# Patient Record
Sex: FEMALE | Race: WHITE | NOT HISPANIC OR LATINO | Employment: FULL TIME | URBAN - NONMETROPOLITAN AREA
[De-identification: names, ages, dates, MRNs, and addresses within clinical notes are randomized per-mention and may not be internally consistent; named-entity substitution may affect disease eponyms.]

---

## 2023-01-25 ENCOUNTER — OFFICE VISIT (OUTPATIENT)
Dept: ENDOCRINOLOGY | Facility: CLINIC | Age: 38
End: 2023-01-25
Payer: COMMERCIAL

## 2023-01-25 VITALS
BODY MASS INDEX: 37.49 KG/M2 | OXYGEN SATURATION: 99 % | SYSTOLIC BLOOD PRESSURE: 122 MMHG | DIASTOLIC BLOOD PRESSURE: 86 MMHG | HEIGHT: 63 IN | HEART RATE: 109 BPM | WEIGHT: 211.6 LBS

## 2023-01-25 DIAGNOSIS — E06.3 HASHIMOTO'S THYROIDITIS: Primary | ICD-10-CM

## 2023-01-25 PROBLEM — E03.8 HYPOTHYROIDISM DUE TO HASHIMOTO'S THYROIDITIS: Status: ACTIVE | Noted: 2023-01-25

## 2023-01-25 PROCEDURE — 99203 OFFICE O/P NEW LOW 30 MIN: CPT | Performed by: NURSE PRACTITIONER

## 2023-01-25 NOTE — ASSESSMENT & PLAN NOTE
With her TPO antibodies being positive, suggests Hashimotos thyroiditis.  At the time of her most recent labs, her TFT's were in range.  She is clinically hypothyroid.  Will obtain TFT's today with medication initiation as indicated.  Will obtain US Thyroid.  Follow-up in 6 months.

## 2023-01-25 NOTE — PROGRESS NOTES
Chief Complaint   Patient presents with   • Thyroid Problem     Fatigue        Referring Provider  Yvonne Nunes APRN HPI Heather YOLANDA Butler is a 37 y.o. female had concerns including Thyroid Problem (Fatigue).   Seen as a new patient.  Thyroid.    She had issues with her thyroid levels fluctuating in the past.  She has been on T4 on and off in the past.  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  She has continued to have increased symptoms and had some other labs that were positive. She has intermittent compressive s/sx's.     Birth state: [place]  Previous history of radiation to face/neck: none  Consuming foods high in iodine: none  Family history of thyroid complications: sister-hypothyroidism, father-hypo    The following portions of the patient's history were reviewed and updated as appropriate: allergies, current medications, past family history, past medical history, past social history, past surgical history and problem list.    Thyroid Labs 10/2022  TSH: 1.42  TPO Antibody: 55.9 H    No past medical history on file.  No past surgical history on file.   No family history on file.   Social History     Socioeconomic History   • Marital status: Single   Tobacco Use   • Smoking status: Never   • Smokeless tobacco: Never   Vaping Use   • Vaping Use: Never used   Substance and Sexual Activity   • Alcohol use: Not Currently   • Drug use: Never   • Sexual activity: Defer      Allergies   Allergen Reactions   • Doxycycline Anaphylaxis      No current outpatient medications on file prior to visit.     No current facility-administered medications on file prior to visit.        Review of Systems   Constitutional: Positive for fatigue and unexpected weight gain.        Inability to lose weight.   Eyes: Positive for blurred vision.   Endocrine: Positive for cold intolerance.   Skin: Positive for dry skin.        Brittle hair   Psychiatric/Behavioral: Positive for sleep  "disturbance.   All other systems reviewed and are negative.     /86 (BP Location: Right arm, Patient Position: Sitting, Cuff Size: Adult)   Pulse 109   Ht 160 cm (63\")   Wt 96 kg (211 lb 9.6 oz)   SpO2 99%   BMI 37.48 kg/m²      Physical Exam  Vitals reviewed.   Constitutional:       Appearance: Normal appearance.   Eyes:      Extraocular Movements: Extraocular movements intact.   Neck:      Thyroid: Thyromegaly and thyroid tenderness present. No thyroid mass.   Cardiovascular:      Rate and Rhythm: Normal rate.   Pulmonary:      Effort: Pulmonary effort is normal.   Skin:     General: Skin is warm.   Neurological:      General: No focal deficit present.      Mental Status: She is alert and oriented to person, place, and time.   Psychiatric:         Mood and Affect: Mood normal.         Behavior: Behavior normal.         Thought Content: Thought content normal.         Judgment: Judgment normal.       Labs/Imaging  CMP  No results found for: GLUCOSE, BUN, CREATININE, EGFRIFNONA, EGFRIFAFRI, BCR, K, CO2, CALCIUM, PROTENTOTREF, ALBUMIN, LABIL2, BILIRUBIN, AST, ALT     CBC w/DIFF No results found for: WBC, RBC, HGB, HCT, MCV, MCH, MCHC, RDW, RDWSD, MPV, PLT, NEUTRORELPCT, LYMPHORELPCT, MONORELPCT, EOSRELPCT, BASORELPCT, AUTOIGPER, NEUTROABS, LYMPHSABS, MONOSABS, EOSABS, BASOSABS, AUTOIGNUM, NRBC    TSH  No results found for: TSH    T4  No results found for: FREET4  No results found for: G5OOJUH    T3  No results found for: T3FREE  No results found for: R7FFFQS    TRAb  No results found for: TSURCPAB    TPO  No results found for: THYROIDAB    No valid procedures specified.    Assessment and Plan    Diagnoses and all orders for this visit:    1. Hashimoto's thyroiditis (Primary)  Assessment & Plan:  With her TPO antibodies being positive, suggests Hashimotos thyroiditis.  At the time of her most recent labs, her TFT's were in range.  She is clinically hypothyroid.  Will obtain TFT's today with medication " initiation as indicated.  Will obtain US Thyroid.  Follow-up in 6 months.    Orders:  -     T3, Free; Future  -     T4, Free; Future  -     TSH; Future  -     US Thyroid; Future    Other orders  -     Cancel: TSH  -     Cancel: T4, Free  -     Cancel: T3, Free  -     Cancel: US Thyroid  -     Cancel: US Thyroid; Future       Return in about 6 months (around 7/25/2023) for Follow-up appointment. The patient was instructed to contact the clinic with any interval questions or concerns.      This document has been electronically signed by LUIGI Gongora  January 25, 2023 13:49 EST   Endocrinology

## 2023-07-26 ENCOUNTER — OFFICE VISIT (OUTPATIENT)
Dept: ENDOCRINOLOGY | Facility: CLINIC | Age: 38
End: 2023-07-26
Payer: COMMERCIAL

## 2023-07-26 VITALS
HEIGHT: 63 IN | WEIGHT: 210.6 LBS | SYSTOLIC BLOOD PRESSURE: 122 MMHG | BODY MASS INDEX: 37.32 KG/M2 | OXYGEN SATURATION: 99 % | DIASTOLIC BLOOD PRESSURE: 76 MMHG | HEART RATE: 74 BPM

## 2023-07-26 DIAGNOSIS — E06.3 HASHIMOTO'S THYROIDITIS: Primary | ICD-10-CM

## 2023-07-26 RX ORDER — LEVOTHYROXINE SODIUM 0.03 MG/1
25 TABLET ORAL DAILY
Qty: 30 TABLET | Refills: 2 | Status: SHIPPED | OUTPATIENT
Start: 2023-07-26

## 2023-07-26 NOTE — ASSESSMENT & PLAN NOTE
-Clinically hypothyroid.    -Most recent TFT's in range.  Will try low dose T4 to see if it will help with her compressive s/sx's.  Start T4 25 mcg QD.  Reminded of proper administration including taking 7 pills per week on an empty stomach with no missed doses, waiting 30-60 minutes prior to other medications or food.  -Will obtain US Thyroid.    -Follow-up in 2 months.

## 2023-07-26 NOTE — PROGRESS NOTES
Chief Complaint   Patient presents with    Hashimoto's Thyroiditis        Referring Provider  No ref. provider found     HPI   Thea Butler is a 37 y.o. female had concerns including Hashimoto's Thyroiditis.   Hashimoto's Thyroiditis    She reports that she is having issues with increased fatigue, difficulty sleeping and mild intermittent compressive sx's.  Most recent labs:  07/25/2023  TSH: 1.2  Free T4: 1.13  Free T3: 3.23    She had issues with her thyroid levels fluctuating in the past.  She has been on T4 on and off in the past.  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  She has continued to have increased symptoms and had some other labs that were positive. She has intermittent compressive s/sx's.     Birth state: [place]  Previous history of radiation to face/neck: none  Consuming foods high in iodine: none  Family history of thyroid complications: sister-hypothyroidism, father-hypo    The following portions of the patient's history were reviewed and updated as appropriate: allergies, current medications, past family history, past medical history, past social history, past surgical history and problem list.      Thyroid Labs 10/2022  TSH: 1.42  TPO Antibody: 55.9 H    History reviewed. No pertinent past medical history.  History reviewed. No pertinent surgical history.   History reviewed. No pertinent family history.   Social History     Socioeconomic History    Marital status: Single   Tobacco Use    Smoking status: Never    Smokeless tobacco: Never   Vaping Use    Vaping Use: Never used   Substance and Sexual Activity    Alcohol use: Not Currently    Drug use: Never    Sexual activity: Defer      Allergies   Allergen Reactions    Doxycycline Anaphylaxis      No current outpatient medications on file prior to visit.     No current facility-administered medications on file prior to visit.        Review of Systems   Constitutional:  Positive for fatigue and unexpected  "weight gain.        Inability to lose weight.   HENT:  Positive for trouble swallowing.    Eyes:  Positive for blurred vision.   Endocrine: Positive for cold intolerance.   Musculoskeletal:  Positive for arthralgias and myalgias.   Skin:  Positive for dry skin.        Brittle hair   Psychiatric/Behavioral:  Positive for sleep disturbance.    All other systems reviewed and are negative.     /76 (BP Location: Left arm, Patient Position: Sitting, Cuff Size: Adult)   Pulse 74   Ht 160 cm (63\")   Wt 95.5 kg (210 lb 9.6 oz)   SpO2 99%   BMI 37.31 kg/m²      Physical Exam  Vitals reviewed.   Constitutional:       Appearance: Normal appearance.   Eyes:      Extraocular Movements: Extraocular movements intact.   Neck:      Thyroid: Thyromegaly and thyroid tenderness present. No thyroid mass.   Cardiovascular:      Rate and Rhythm: Normal rate.   Pulmonary:      Effort: Pulmonary effort is normal.   Skin:     General: Skin is warm.   Neurological:      General: No focal deficit present.      Mental Status: She is alert and oriented to person, place, and time.   Psychiatric:         Mood and Affect: Mood normal.         Behavior: Behavior normal.         Thought Content: Thought content normal.         Judgment: Judgment normal.     Labs/Imaging  CMP  No results found for: GLUCOSE, BUN, CREATININE, EGFRIFNONA, EGFRIFAFRI, BCR, K, CO2, CALCIUM, PROTENTOTREF, ALBUMIN, LABIL2, BILIRUBIN, AST, ALT     CBC w/DIFF No results found for: WBC, RBC, HGB, HCT, MCV, MCH, MCHC, RDW, RDWSD, MPV, PLT, NEUTRORELPCT, LYMPHORELPCT, MONORELPCT, EOSRELPCT, BASORELPCT, AUTOIGPER, NEUTROABS, LYMPHSABS, MONOSABS, EOSABS, BASOSABS, AUTOIGNUM, NRBC    TSH  No results found for: TSH    T4  No results found for: FREET4  No results found for: J4MNMIA    T3  No results found for: T3FREE  No results found for: W7DNNCV    TRAb  No results found for: TSURCPAB    TPO  No results found for: THYROIDAB    No valid procedures specified.    Assessment " and Plan    Diagnoses and all orders for this visit:    1. Hashimoto's thyroiditis (Primary)  Assessment & Plan:  -Clinically hypothyroid.    -Most recent TFT's in range.  Will try low dose T4 to see if it will help with her compressive s/sx's.  Start T4 25 mcg QD.  Reminded of proper administration including taking 7 pills per week on an empty stomach with no missed doses, waiting 30-60 minutes prior to other medications or food.  -Will obtain US Thyroid.    -Follow-up in 2 months.    Orders:  -     Cancel: US Thyroid; Future  -     TSH; Future  -     T4, Free; Future  -     T3, Free; Future  -     US Thyroid; Future    Other orders  -     levothyroxine (SYNTHROID, LEVOTHROID) 25 MCG tablet; Take 1 tablet by mouth Daily.  Dispense: 30 tablet; Refill: 2         Return in about 2 months (around 9/26/2023) for Follow-up appointment. The patient was instructed to contact the clinic with any interval questions or concerns.      This document has been electronically signed by LUIGI Gongora  July 26, 2023 14:31 EDT   Endocrinology

## 2023-11-28 NOTE — TELEPHONE ENCOUNTER
PATIENT IS SCHEDULED TO BE SEEN IN MARCH 2024. IF LABS ARE NEEDED, PLEASE SEND ORDERS TO [facility] AND LET PATIENT KNOW.

## 2023-11-29 RX ORDER — LEVOTHYROXINE SODIUM 0.03 MG/1
25 TABLET ORAL DAILY
Qty: 30 TABLET | Refills: 2 | Status: SHIPPED | OUTPATIENT
Start: 2023-11-29

## 2023-12-07 ENCOUNTER — TELEPHONE (OUTPATIENT)
Dept: ENDOCRINOLOGY | Facility: CLINIC | Age: 38
End: 2023-12-07
Payer: COMMERCIAL

## 2023-12-07 DIAGNOSIS — E06.3 HASHIMOTO'S THYROIDITIS: Primary | ICD-10-CM

## 2023-12-07 NOTE — TELEPHONE ENCOUNTER
Patient is requesting to get lab work done as she is having some symptoms. She would like to get it done at [facility]. I will fax order over to [phone].

## 2024-03-20 DIAGNOSIS — E06.3 HASHIMOTO'S THYROIDITIS: Primary | ICD-10-CM

## 2024-03-20 RX ORDER — LEVOTHYROXINE SODIUM 0.03 MG/1
25 TABLET ORAL DAILY
Qty: 30 TABLET | Refills: 1 | Status: SHIPPED | OUTPATIENT
Start: 2024-03-20 | End: 2024-03-20 | Stop reason: SDUPTHER

## 2024-03-20 RX ORDER — LEVOTHYROXINE SODIUM 0.03 MG/1
25 TABLET ORAL DAILY
Qty: 30 TABLET | Refills: 1 | Status: SHIPPED | OUTPATIENT
Start: 2024-03-20

## 2024-03-20 NOTE — TELEPHONE ENCOUNTER
Patient is wanting to get her lab work done prior to her visit at [facility]. Can you please order them and I will fax them over for patient?

## 2024-03-26 ENCOUNTER — OFFICE VISIT (OUTPATIENT)
Dept: ENDOCRINOLOGY | Facility: CLINIC | Age: 39
End: 2024-03-26
Payer: COMMERCIAL

## 2024-03-26 VITALS
WEIGHT: 208.2 LBS | DIASTOLIC BLOOD PRESSURE: 94 MMHG | HEART RATE: 85 BPM | SYSTOLIC BLOOD PRESSURE: 135 MMHG | BODY MASS INDEX: 36.89 KG/M2 | OXYGEN SATURATION: 96 % | HEIGHT: 63 IN

## 2024-03-26 DIAGNOSIS — E06.3 HASHIMOTO'S THYROIDITIS: Primary | ICD-10-CM

## 2024-03-26 PROCEDURE — 99213 OFFICE O/P EST LOW 20 MIN: CPT | Performed by: NURSE PRACTITIONER

## 2024-03-26 RX ORDER — LISDEXAMFETAMINE DIMESYLATE CAPSULES 50 MG/1
CAPSULE ORAL
COMMUNITY

## 2024-03-26 RX ORDER — PAROXETINE 30 MG/1
TABLET, FILM COATED ORAL
COMMUNITY

## 2024-03-26 NOTE — ASSESSMENT & PLAN NOTE
-Clinically hypothyroid.    -TFT's today with medication adjustment accordingly.  -Reminded of proper administration including taking 7 pills per week on an empty stomach with no missed doses, waiting 30-60 minutes prior to other medications or food.  -Will consider adding on T3 to help with symptoms.  -Follow-up in 6 months.

## 2024-03-26 NOTE — PROGRESS NOTES
Chief Complaint   Patient presents with    Hashimoto's Thyroiditis        Referring Provider  No ref. provider found     HPI   Thea Butler is a 38 y.o. female had concerns including Hashimoto's Thyroiditis.   Hashimoto's Thyroiditis    She is currently taking T4 25 mcg QD.  She is having increased symptoms with increased fatigue, difficulty sleeping and mild intermittent compressive sx's.  She is taking this regularly without missing doses.  She denies taking any conflicting medication.  US Thyroid: 03/2024  Findings:  The right lobe of the thyroid measures 5.4 x 1.9 x 1.3 cm and demonstrates a heterogeneous echotexture.  The left lobe of the thyroid 4.2 x 1.7 x 1.3 cm and demonstrates a heterogeneous echotexture.  The isthmus measures 0.4 cm.  There are no atypical lymph nodes identified within the bilateral neck.  Impression:  Heterogeneous thyroid gland.  No nodules.    History:  Most recent labs:  07/25/2023  TSH: 1.2  Free T4: 1.13  Free T3: 3.23    She had issues with her thyroid levels fluctuating in the past.  She has been on T4 on and off in the past.  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  She has continued to have increased symptoms and had some other labs that were positive. She has intermittent compressive s/sx's.     Birth state: [place]  Previous history of radiation to face/neck: none  Consuming foods high in iodine: none  Family history of thyroid complications: sister-hypothyroidism, father-hypo    The following portions of the patient's history were reviewed and updated as appropriate: allergies, current medications, past family history, past medical history, past social history, past surgical history and problem list.      Thyroid Labs 10/2022  TSH: 1.42  TPO Antibody: 55.9 H    History reviewed. No pertinent past medical history.  History reviewed. No pertinent surgical history.   History reviewed. No pertinent family history.   Social History  "    Socioeconomic History    Marital status: Single   Tobacco Use    Smoking status: Never    Smokeless tobacco: Never   Vaping Use    Vaping status: Never Used   Substance and Sexual Activity    Alcohol use: Not Currently    Drug use: Never    Sexual activity: Defer      Allergies   Allergen Reactions    Doxycycline Anaphylaxis      Current Outpatient Medications on File Prior to Visit   Medication Sig Dispense Refill    Cariprazine HCl (Vraylar) 3 MG capsule capsule       levothyroxine (SYNTHROID, LEVOTHROID) 25 MCG tablet Take 1 tablet by mouth Daily. 30 tablet 1    lisdexamfetamine (Vyvanse) 50 MG capsule       PARoxetine (Paxil) 30 MG tablet        No current facility-administered medications on file prior to visit.        Review of Systems   Constitutional:  Positive for fatigue and unexpected weight gain.        Inability to lose weight.   HENT:  Positive for trouble swallowing.    Eyes:  Positive for blurred vision.   Endocrine: Positive for cold intolerance.   Musculoskeletal:  Positive for arthralgias and myalgias.   Skin:  Positive for dry skin.        Brittle hair   Psychiatric/Behavioral:  Positive for sleep disturbance.    All other systems reviewed and are negative.     /94 (BP Location: Right arm, Patient Position: Sitting, Cuff Size: Adult)   Pulse 85   Ht 160 cm (63\")   Wt 94.4 kg (208 lb 3.2 oz)   SpO2 96%   BMI 36.88 kg/m²      Physical Exam  Vitals reviewed.   Constitutional:       Appearance: Normal appearance.   Eyes:      Extraocular Movements: Extraocular movements intact.   Neck:      Thyroid: Thyromegaly and thyroid tenderness present. No thyroid mass.   Cardiovascular:      Rate and Rhythm: Normal rate.   Pulmonary:      Effort: Pulmonary effort is normal.   Skin:     General: Skin is warm.   Neurological:      General: No focal deficit present.      Mental Status: She is alert and oriented to person, place, and time.   Psychiatric:         Mood and Affect: Mood normal.         " "Behavior: Behavior normal.         Thought Content: Thought content normal.         Judgment: Judgment normal.       Labs/Imaging  CMP  No results found for: \"GLUCOSE\", \"BUN\", \"CREATININE\", \"EGFRIFNONA\", \"EGFRIFAFRI\", \"BCR\", \"K\", \"CO2\", \"CALCIUM\", \"PROTENTOTREF\", \"ALBUMIN\", \"LABIL2\", \"BILIRUBIN\", \"AST\", \"ALT\"     CBC w/DIFF No results found for: \"WBC\", \"RBC\", \"HGB\", \"HCT\", \"MCV\", \"MCH\", \"MCHC\", \"RDW\", \"RDWSD\", \"MPV\", \"PLT\", \"NEUTRORELPCT\", \"LYMPHORELPCT\", \"MONORELPCT\", \"EOSRELPCT\", \"BASORELPCT\", \"AUTOIGPER\", \"NEUTROABS\", \"LYMPHSABS\", \"MONOSABS\", \"EOSABS\", \"BASOSABS\", \"AUTOIGNUM\", \"NRBC\"    TSH  No results found for: \"TSH\"    T4  No results found for: \"FREET4\"  No results found for: \"A5PBEKM\"    T3  No results found for: \"T3FREE\"  No results found for: \"C2ILFDU\"    TRAb  No results found for: \"TSURCPAB\"    TPO  No results found for: \"THYROIDAB\"    No valid procedures specified.    Assessment and Plan    Diagnoses and all orders for this visit:    1. Hashimoto's thyroiditis (Primary)  Assessment & Plan:  -Clinically hypothyroid.    -TFT's today with medication adjustment accordingly.  -Reminded of proper administration including taking 7 pills per week on an empty stomach with no missed doses, waiting 30-60 minutes prior to other medications or food.  -Will consider adding on T3 to help with symptoms.  -Follow-up in 6 months.    Orders:  -     Cancel: TSH  -     Cancel: T4, free  -     Cancel: T3, Free  -     T3, Free; Future  -     T4, free; Future  -     TSH; Future           Return in about 6 months (around 9/26/2024) for Follow-up appointment. The patient was instructed to contact the clinic with any interval questions or concerns.      This document has been electronically signed by LUIGI Gongora  March 26, 2024 12:47 EDT   Endocrinology  "

## 2024-04-26 RX ORDER — LEVOTHYROXINE SODIUM 0.03 MG/1
25 TABLET ORAL DAILY
Qty: 30 TABLET | Refills: 1 | Status: SHIPPED | OUTPATIENT
Start: 2024-04-26

## 2024-04-26 NOTE — TELEPHONE ENCOUNTER
Patient called to check on her lab work. We had not received that yet. I have tried several times to reach out to [facility] where she had it done and had to leave messages. The issue is patient will be out of medicine this weekend.

## 2024-09-26 ENCOUNTER — OFFICE VISIT (OUTPATIENT)
Dept: ENDOCRINOLOGY | Facility: CLINIC | Age: 39
End: 2024-09-26
Payer: COMMERCIAL

## 2024-09-26 VITALS
BODY MASS INDEX: 35.29 KG/M2 | OXYGEN SATURATION: 100 % | SYSTOLIC BLOOD PRESSURE: 124 MMHG | HEART RATE: 81 BPM | DIASTOLIC BLOOD PRESSURE: 87 MMHG | WEIGHT: 199.2 LBS

## 2024-09-26 DIAGNOSIS — E06.3 HASHIMOTO'S THYROIDITIS: Primary | ICD-10-CM

## 2024-09-26 RX ORDER — TRAZODONE HYDROCHLORIDE 50 MG/1
50 TABLET, FILM COATED ORAL NIGHTLY
COMMUNITY

## 2024-10-10 ENCOUNTER — TELEPHONE (OUTPATIENT)
Dept: ENDOCRINOLOGY | Facility: CLINIC | Age: 39
End: 2024-10-10
Payer: COMMERCIAL

## 2024-10-10 RX ORDER — LEVOTHYROXINE SODIUM 25 UG/1
25 TABLET ORAL DAILY
Qty: 30 TABLET | Refills: 1 | Status: SHIPPED | OUTPATIENT
Start: 2024-10-10 | End: 2024-10-11 | Stop reason: SDUPTHER

## 2024-10-11 RX ORDER — LEVOTHYROXINE SODIUM 25 UG/1
25 TABLET ORAL DAILY
Qty: 30 TABLET | Refills: 1 | Status: SHIPPED | OUTPATIENT
Start: 2024-10-11

## 2025-02-24 RX ORDER — LEVOTHYROXINE SODIUM 25 UG/1
25 TABLET ORAL DAILY
Qty: 30 TABLET | Refills: 1 | Status: SHIPPED | OUTPATIENT
Start: 2025-02-24

## 2025-03-18 DIAGNOSIS — E06.3 HASHIMOTO'S THYROIDITIS: Primary | ICD-10-CM

## 2025-03-28 ENCOUNTER — OFFICE VISIT (OUTPATIENT)
Dept: ENDOCRINOLOGY | Facility: CLINIC | Age: 40
End: 2025-03-28
Payer: COMMERCIAL

## 2025-03-28 VITALS
WEIGHT: 198.4 LBS | OXYGEN SATURATION: 99 % | HEART RATE: 80 BPM | DIASTOLIC BLOOD PRESSURE: 87 MMHG | BODY MASS INDEX: 35.14 KG/M2 | SYSTOLIC BLOOD PRESSURE: 130 MMHG

## 2025-03-28 DIAGNOSIS — E06.3 HASHIMOTO'S THYROIDITIS: Primary | ICD-10-CM

## 2025-03-28 RX ORDER — ESCITALOPRAM OXALATE 20 MG/1
20 TABLET ORAL DAILY
COMMUNITY
Start: 2025-02-10

## 2025-03-28 RX ORDER — LEVOTHYROXINE SODIUM 25 UG/1
25 TABLET ORAL DAILY
Qty: 90 TABLET | Refills: 1 | Status: SHIPPED | OUTPATIENT
Start: 2025-03-28

## 2025-03-28 RX ORDER — ERGOCALCIFEROL 1.25 MG/1
50000 CAPSULE, LIQUID FILLED ORAL WEEKLY
Qty: 5 CAPSULE | Refills: 4 | Status: SHIPPED | OUTPATIENT
Start: 2025-03-28

## 2025-03-28 NOTE — PROGRESS NOTES
Chief Complaint   Patient presents with    Thyroid Problem     F/u        Referring Provider  No ref. provider found     HPI   Thea Butler is a 39 y.o. female had concerns including Thyroid Problem (F/u).   Hashimoto's Thyroiditis    She still have some mild intermittent dysphagia with breads. She is otherwise doing well.  She is currently taking T4 25 mcg QD.  She is having increased symptoms with increased fatigue, difficulty sleeping and mild intermittent compressive sx's.  She is taking this regularly without missing doses.  She denies taking any conflicting medication.  Most recent labs: 03/2025  TSH: 1.240  Ft4: 1.43  Vit D: 24.7 L    US Thyroid: 03/2024  Findings:  The right lobe of the thyroid measures 5.4 x 1.9 x 1.3 cm and demonstrates a heterogeneous echotexture.  The left lobe of the thyroid 4.2 x 1.7 x 1.3 cm and demonstrates a heterogeneous echotexture.  The isthmus measures 0.4 cm.  There are no atypical lymph nodes identified within the bilateral neck.  Impression:  Heterogeneous thyroid gland.  No nodules.    History:  Most recent labs:  07/25/2023  TSH: 1.2  Free T4: 1.13  Free T3: 3.23    She had issues with her thyroid levels fluctuating in the past.  She has been on T4 on and off in the past.  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  She has continued to have increased symptoms and had some other labs that were positive. She has intermittent compressive s/sx's.     Birth state: [place]  Previous history of radiation to face/neck: none  Consuming foods high in iodine: none  Family history of thyroid complications: sister-hypothyroidism, father-hypo    The following portions of the patient's history were reviewed and updated as appropriate: allergies, current medications, past family history, past medical history, past social history, past surgical history and problem list.      Thyroid Labs 10/2022  TSH: 1.42  TPO Antibody: 55.9 H    Past Medical History:    Diagnosis Date    Hypothyroidism     Unknown    Vitamin D deficiency      History reviewed. No pertinent surgical history.   Family History   Problem Relation Age of Onset    Anemia Mother     Cancer Mother     Diabetes Father     Hypertension Father     Obesity Father     Thyroid disease Father     Diabetes Maternal Grandmother     Cancer Maternal Aunt     Cancer Maternal Uncle     Cancer Maternal Uncle     Hypertension Brother     Thyroid disease Sister       Social History     Socioeconomic History    Marital status: Single   Tobacco Use    Smoking status: Never    Smokeless tobacco: Never   Vaping Use    Vaping status: Never Used   Substance and Sexual Activity    Alcohol use: Yes     Comment: OCCASIONALLY    Drug use: Never    Sexual activity: Defer      Allergies   Allergen Reactions    Doxycycline Anaphylaxis      Current Outpatient Medications on File Prior to Visit   Medication Sig Dispense Refill    escitalopram (Lexapro) 20 MG tablet Take 1 tablet by mouth Daily.      lisdexamfetamine (Vyvanse) 50 MG capsule       traZODone (DESYREL) 50 MG tablet Take 1 tablet by mouth Every Night.      [DISCONTINUED] levothyroxine (SYNTHROID, LEVOTHROID) 25 MCG tablet Take 1 tablet by mouth Daily. 30 tablet 1    [DISCONTINUED] Cariprazine HCl (Vraylar) 3 MG capsule capsule  (Patient not taking: Reported on 3/28/2025)      [DISCONTINUED] PARoxetine (Paxil) 30 MG tablet  (Patient not taking: Reported on 3/28/2025)       No current facility-administered medications on file prior to visit.        Review of Systems   Constitutional:  Positive for fatigue and unexpected weight gain.        Inability to lose weight.   HENT:  Positive for trouble swallowing.    Eyes:  Positive for blurred vision.   Endocrine: Positive for cold intolerance.   Musculoskeletal:  Positive for arthralgias and myalgias.   Skin:  Positive for dry skin.        Brittle hair   Psychiatric/Behavioral:  Positive for sleep disturbance.    All other systems  "reviewed and are negative.     /87 (BP Location: Right arm, Patient Position: Sitting, Cuff Size: Adult)   Pulse 80   Wt 90 kg (198 lb 6.4 oz)   LMP 03/28/2025 (Exact Date)   SpO2 99%   Breastfeeding No   BMI 35.14 kg/m²      Physical Exam  Vitals reviewed.   Constitutional:       Appearance: Normal appearance.   Eyes:      Extraocular Movements: Extraocular movements intact.   Neck:      Thyroid: Thyromegaly and thyroid tenderness present. No thyroid mass.   Cardiovascular:      Rate and Rhythm: Normal rate.   Pulmonary:      Effort: Pulmonary effort is normal.   Skin:     General: Skin is warm.   Neurological:      General: No focal deficit present.      Mental Status: She is alert and oriented to person, place, and time.   Psychiatric:         Mood and Affect: Mood normal.         Behavior: Behavior normal.         Thought Content: Thought content normal.         Judgment: Judgment normal.       Labs/Imaging  CMP  No results found for: \"GLUCOSE\", \"BUN\", \"CREATININE\", \"EGFRIFNONA\", \"EGFRIFAFRI\", \"BCR\", \"K\", \"CO2\", \"CALCIUM\", \"PROTENTOTREF\", \"ALBUMIN\", \"LABIL2\", \"BILIRUBIN\", \"AST\", \"ALT\"     CBC w/DIFF No results found for: \"WBC\", \"RBC\", \"HGB\", \"HCT\", \"MCV\", \"MCH\", \"MCHC\", \"RDW\", \"RDWSD\", \"MPV\", \"PLT\", \"NEUTRORELPCT\", \"LYMPHORELPCT\", \"MONORELPCT\", \"EOSRELPCT\", \"BASORELPCT\", \"AUTOIGPER\", \"NEUTROABS\", \"LYMPHSABS\", \"MONOSABS\", \"EOSABS\", \"BASOSABS\", \"AUTOIGNUM\", \"NRBC\"    TSH  No results found for: \"TSH\"    T4  No results found for: \"FREET4\"  No results found for: \"E6ZBAEO\"    T3  No results found for: \"T3FREE\"  No results found for: \"R8BGPZO\"    TRAb  No results found for: \"TSURCPAB\"    TPO  No results found for: \"THYROIDAB\"    No valid procedures specified.    Assessment and Plan    Diagnoses and all orders for this visit:    1. Hashimoto's thyroiditis (Primary)  Assessment & Plan:  -Clinically euthyroid.    -TFT's today with medication adjustment accordingly.  -Reminded of proper administration including " taking 7 pills per week on an empty stomach with no missed doses, waiting 30-60 minutes prior to other medications or food.  -Will notify of US Thyroid results when available.  -Follow-up in 1 year.      Other orders  -     vitamin D (ERGOCALCIFEROL) 1.25 MG (55899 UT) capsule capsule; Take 1 capsule by mouth 1 (One) Time Per Week.  Dispense: 5 capsule; Refill: 4  -     levothyroxine (SYNTHROID, LEVOTHROID) 25 MCG tablet; Take 1 tablet by mouth Daily.  Dispense: 90 tablet; Refill: 1        Return in about 1 year (around 3/28/2026) for Follow-up appointment. The patient was instructed to contact the clinic with any interval questions or concerns.      This document has been electronically signed by LUIGI Gongora  March 28, 2025 11:04 EDT   Endocrinology    Please note that portions of this document were completed with a voice recognition program. Efforts were made to edit the dictations, but occasionally words are mis-transcribed.

## 2025-03-28 NOTE — ASSESSMENT & PLAN NOTE
-Clinically euthyroid.    -TFT's today with medication adjustment accordingly.  -Reminded of proper administration including taking 7 pills per week on an empty stomach with no missed doses, waiting 30-60 minutes prior to other medications or food.  -Will notify of US Thyroid results when available.  -Follow-up in 1 year.